# Patient Record
Sex: FEMALE | Race: WHITE | Employment: UNEMPLOYED | URBAN - METROPOLITAN AREA
[De-identification: names, ages, dates, MRNs, and addresses within clinical notes are randomized per-mention and may not be internally consistent; named-entity substitution may affect disease eponyms.]

---

## 2022-11-30 ENCOUNTER — APPOINTMENT (OUTPATIENT)
Dept: GENERAL RADIOLOGY | Age: 47
End: 2022-11-30
Payer: COMMERCIAL

## 2022-11-30 ENCOUNTER — HOSPITAL ENCOUNTER (EMERGENCY)
Age: 47
Discharge: HOME | End: 2022-11-30
Payer: COMMERCIAL

## 2022-11-30 VITALS
TEMPERATURE: 98.1 F | WEIGHT: 137.56 LBS | OXYGEN SATURATION: 99 % | DIASTOLIC BLOOD PRESSURE: 78 MMHG | SYSTOLIC BLOOD PRESSURE: 162 MMHG | BODY MASS INDEX: 24.38 KG/M2 | HEIGHT: 63 IN | HEART RATE: 87 BPM | RESPIRATION RATE: 20 BRPM

## 2022-11-30 DIAGNOSIS — J40 BRONCHITIS: Primary | ICD-10-CM

## 2022-11-30 LAB
RAPID INFLUENZA  B AGN: NEGATIVE
RAPID INFLUENZA A AGN: NEGATIVE
SARS-COV-2, NAAT: NOT DETECTED

## 2022-11-30 PROCEDURE — 87804 INFLUENZA ASSAY W/OPTIC: CPT

## 2022-11-30 PROCEDURE — 87635 SARS-COV-2 COVID-19 AMP PRB: CPT

## 2022-11-30 PROCEDURE — 99284 EMERGENCY DEPT VISIT MOD MDM: CPT

## 2022-11-30 PROCEDURE — 71046 X-RAY EXAM CHEST 2 VIEWS: CPT

## 2022-11-30 PROCEDURE — 6370000000 HC RX 637 (ALT 250 FOR IP): Performed by: NURSE PRACTITIONER

## 2022-11-30 RX ORDER — AMOXICILLIN 500 MG/1
500 CAPSULE ORAL 2 TIMES DAILY
Qty: 14 CAPSULE | Refills: 0 | Status: SHIPPED | OUTPATIENT
Start: 2022-11-30 | End: 2022-12-07

## 2022-11-30 RX ORDER — BENZONATATE 100 MG/1
100-200 CAPSULE ORAL 3 TIMES DAILY PRN
Qty: 30 CAPSULE | Refills: 0 | Status: SHIPPED | OUTPATIENT
Start: 2022-11-30 | End: 2022-12-07

## 2022-11-30 RX ORDER — BUTALBITAL, ACETAMINOPHEN AND CAFFEINE 50; 325; 40 MG/1; MG/1; MG/1
1 TABLET ORAL EVERY 6 HOURS PRN
COMMUNITY
Start: 2022-11-03

## 2022-11-30 RX ORDER — AMLODIPINE BESYLATE 10 MG/1
TABLET ORAL
COMMUNITY
Start: 2022-11-23

## 2022-11-30 RX ORDER — NITROGLYCERIN 0.4 MG/1
0.4 TABLET SUBLINGUAL EVERY 5 MIN PRN
COMMUNITY
Start: 2022-07-12

## 2022-11-30 RX ORDER — BENZONATATE 200 MG/1
CAPSULE ORAL
COMMUNITY
End: 2022-11-30

## 2022-11-30 RX ORDER — PREDNISONE 20 MG/1
60 TABLET ORAL ONCE
Status: COMPLETED | OUTPATIENT
Start: 2022-11-30 | End: 2022-11-30

## 2022-11-30 RX ORDER — CLOPIDOGREL BISULFATE 75 MG/1
TABLET ORAL
COMMUNITY
Start: 2022-11-23

## 2022-11-30 RX ORDER — ASPIRIN 81 MG/1
81 TABLET, CHEWABLE ORAL DAILY
COMMUNITY
Start: 2022-06-12

## 2022-11-30 RX ORDER — CODEINE PHOSPHATE AND GUAIFENESIN 10; 100 MG/5ML; MG/5ML
SOLUTION ORAL
COMMUNITY
Start: 2022-11-15

## 2022-11-30 RX ORDER — ROSUVASTATIN CALCIUM 10 MG/1
TABLET, COATED ORAL
COMMUNITY
Start: 2022-11-23

## 2022-11-30 RX ORDER — ALBUTEROL SULFATE 90 UG/1
AEROSOL, METERED RESPIRATORY (INHALATION)
COMMUNITY
End: 2022-11-30

## 2022-11-30 RX ORDER — ALBUTEROL SULFATE 90 UG/1
2 AEROSOL, METERED RESPIRATORY (INHALATION) 4 TIMES DAILY PRN
Qty: 18 G | Refills: 0 | Status: SHIPPED | OUTPATIENT
Start: 2022-11-30

## 2022-11-30 RX ORDER — AMOXICILLIN 250 MG/1
500 CAPSULE ORAL ONCE
Status: COMPLETED | OUTPATIENT
Start: 2022-11-30 | End: 2022-11-30

## 2022-11-30 RX ORDER — PREDNISONE 20 MG/1
TABLET ORAL
Qty: 18 TABLET | Refills: 0 | Status: SHIPPED | OUTPATIENT
Start: 2022-11-30 | End: 2022-12-10

## 2022-11-30 RX ORDER — CYCLOBENZAPRINE HCL 5 MG
TABLET ORAL
COMMUNITY
Start: 2022-11-05

## 2022-11-30 RX ADMIN — PREDNISONE 60 MG: 20 TABLET ORAL at 19:52

## 2022-11-30 RX ADMIN — AMOXICILLIN 500 MG: 250 CAPSULE ORAL at 19:52

## 2022-11-30 ASSESSMENT — PAIN - FUNCTIONAL ASSESSMENT
PAIN_FUNCTIONAL_ASSESSMENT: NONE - DENIES PAIN
PAIN_FUNCTIONAL_ASSESSMENT: 0-10

## 2022-11-30 ASSESSMENT — PAIN DESCRIPTION - ORIENTATION
ORIENTATION: MID
ORIENTATION: MID;LOWER

## 2022-11-30 ASSESSMENT — PAIN SCALES - GENERAL
PAINLEVEL_OUTOF10: 6
PAINLEVEL_OUTOF10: 6

## 2022-11-30 ASSESSMENT — PAIN DESCRIPTION - ONSET
ONSET: ON-GOING
ONSET: SUDDEN

## 2022-11-30 ASSESSMENT — ENCOUNTER SYMPTOMS
COUGH: 1
GASTROINTESTINAL NEGATIVE: 1

## 2022-11-30 ASSESSMENT — PAIN DESCRIPTION - FREQUENCY
FREQUENCY: INTERMITTENT
FREQUENCY: INTERMITTENT

## 2022-11-30 ASSESSMENT — PAIN DESCRIPTION - LOCATION
LOCATION: GROIN
LOCATION: GROIN

## 2022-11-30 ASSESSMENT — PAIN DESCRIPTION - PAIN TYPE
TYPE: SURGICAL PAIN
TYPE: SURGICAL PAIN

## 2022-11-30 ASSESSMENT — PAIN DESCRIPTION - DESCRIPTORS
DESCRIPTORS: SHARP
DESCRIPTORS: BURNING

## 2022-12-01 NOTE — ED TRIAGE NOTES
Pt presents to ED for cough for the past 2 weeks. Pt has hysterectomy on 11/8 and c/o pain due to cough.

## 2022-12-01 NOTE — ED PROVIDER NOTES
[address]  eMERGENCY dEPARTMENT eNCOUnter    Pt Name: @@  MRN: [id number]  [address]  Date of evaluation: 11/30/2022  Provider: EVARISTO Zuñiga CNP    Independently evaluated by the advanced practice provider    [address]       Chief Complaint   Patient presents with    Cough     For the past 2 weeks. HISTORY OF PRESENT ILLNESS  (Location/Symptom, Timing/Onset, Context/Setting, Quality, Duration, Modifying Factors, Severity.)   Gal Peralta is a 50 y.o. female who presents to the emergencydepartment PMHx: CAD, hypertension, tobacco user    Patient presents to the emergency department reporting that she has had an intermittently productive [place] cough for nearly 3 weeks. Her granddaughter and daughter did have influenza. Patient is 3 weeks postop vaginal hysterectomy. She states the cough is making the vaginal sutures feel as if they are pulling. Negative for fever. Negative for chills. Positive for nasal congestion. Intermittent sore throat. Negative for vomiting or diarrhea. Stop smoking November 5. Endorses that she is taken a few puffs off a black and mild to address her cigarette craving. Nursing Notes were reviewed and I agree. REVIEW OF SYSTEMS    (2-9 systems for level 4, 10 or more for level 5)     Review of Systems   Constitutional: Negative. HENT:  Positive for congestion. Respiratory:  Positive for cough. Cardiovascular: Negative. Gastrointestinal: Negative. Genitourinary: Negative. Musculoskeletal: Negative. Neurological: Negative. Except as noted above the remainder of the review of systems was reviewed and negative.        PAST MEDICAL HISTORY         Diagnosis Date    CAD (coronary artery disease)     Hypertension        SURGICAL HISTORY           Procedure Laterality Date    HYSTERECTOMY (CERVIX STATUS UNKNOWN)         CURRENT MEDICATIONS       Discharge Medication List as of 11/30/2022  8:36 PM CONTINUE these medications which have NOT CHANGED    Details   butalbital-acetaminophen-caffeine (FIORICET, ESGIC) -40 MG per tablet Take 1 tablet by mouth every 6 hours as neededHistorical Med      aspirin 81 MG chewable tablet Take 81 mg by mouth dailyHistorical Med      nitroGLYCERIN (NITROSTAT) 0.4 MG SL tablet Place 0.4 mg under the tongue every 5 minutes as neededHistorical Med      amLODIPine (NORVASC) 10 MG tablet Historical Med      clopidogrel (PLAVIX) 75 MG tablet Historical Med      rosuvastatin (CRESTOR) 10 MG tablet Historical Med      cyclobenzaprine (FLEXERIL) 5 MG tablet Historical Med      guaiFENesin-codeine (GUAIFENESIN AC) 100-10 MG/5ML liquid TAKE 5 ML BY MOUTH EVERY 4 HOURS AS NEEDED FOR COUGH FOR UP TO 5 DAYSHistorical Med             ALLERGIES     Patient has no known allergies. FAMILY HISTORY     History reviewed. No pertinent family history. No family status information on file. SOCIAL HISTORY      reports that she quit smoking about 3 weeks ago. Her smoking use included cigarettes. She has never used smokeless tobacco. She reports that she does not currently use alcohol. She reports that she does not currently use drugs. PHYSICAL EXAM    (up to 7 for level 4, 8 or more for level 5)      ED Triage Vitals [11/30/22 1909]   BP Temp Temp Source Heart Rate Resp SpO2 Height Weight   (!) 162/78 98.1 °F (36.7 °C) Oral 87 20 99 % 5' 3\" (1.6 m) 137 lb 9 oz (62.4 kg)       Physical Exam  Vitals and nursing note reviewed. Constitutional:       General: She is not in acute distress. Appearance: She is not ill-appearing, toxic-appearing or diaphoretic. HENT:      Right Ear: Tympanic membrane, ear canal and external ear normal.      Left Ear: Tympanic membrane, ear canal and external ear normal.      Mouth/Throat:      Mouth: Mucous membranes are moist.      Pharynx: Oropharynx is clear. Cardiovascular:      Rate and Rhythm: Normal rate and regular rhythm.    Pulmonary: Effort: Pulmonary effort is normal.      Breath sounds: Normal breath sounds. No wheezing, rhonchi or rales. Skin:     General: Skin is warm and dry. Neurological:      General: No focal deficit present. Mental Status: She is alert. DIAGNOSTIC RESULTS     RADIOLOGY:   Non-plain film images such as CT, Ultrasound and MRI are read by the radiologist. Plain radiographic images are visualized and preliminarily interpreted by EVARISTO Stauffer CNP with the below findings:        Interpretation per the Radiologist below, if available at the time of this note:    XR CHEST (2 VW)   Final Result      1. No findings for acute cardiopulmonary disease. LABS:  Labs Reviewed   COVID-19, RAPID   RAPID INFLUENZA A/B ANTIGENS       All other labs were within normal range or not returned as of this dictation. EMERGENCY DEPARTMENT COURSE and DIFFERENTIAL DIAGNOSIS/MDM:   Vitals:    Vitals:    11/30/22 1909   BP: (!) 162/78   Pulse: 87   Resp: 20   Temp: 98.1 °F (36.7 °C)   TempSrc: Oral   SpO2: 99%   Weight: 137 lb 9 oz (62.4 kg)   Height: 5' 3\" (1.6 m)     Medications   predniSONE (DELTASONE) tablet 60 mg (60 mg Oral Given 11/30/22 1952)   amoxicillin (AMOXIL) capsule 500 mg (500 mg Oral Given 11/30/22 1952)       [facility]    Patient seen and evaluated per myself. Dr. Librado Wei present available for consultation as needed. Clinical impression is consistent with likely URI, bronchitis. Course she did have an influenza exposure and could be recovering from influenza. Chest x-ray will be obtained. Rapid COVID and influenza will be obtained. Patient is not hypoxic or tachycardic. Head ears eyes nose and throat exam is fairly unremarkable. Breath sounds negative for wheezing rales or rhonchi. Will discharge patient home on Augmentin, steroids, and inhaler. She is instructed to follow-up with primary care physician. Continue to use over-the-counter cough syrup.     Chest x-ray negative for acute findings. COVID and rapid influenza also negative. Will proceed with discharge as discussed above. Prescriptions detailed below. Diagnostic testing results and plan of care were rediscussed with the patient. She was in agreement she is discharged home in good condition. PROCEDURES:  Procedures    FINAL IMPRESSION      1. Bronchitis          DISPOSITION/PLAN   DISPOSITION Decision To Discharge 11/30/2022 08:28:04 PM      PATIENT REFERRED TO:  [address]   [facility]    Schedule an appointment as soon as possible for a visit       [facility] [address] [address]  [phone]    As needed, If symptoms worsen    DISCHARGE MEDICATIONS:  Discharge Medication List as of 11/30/2022  8:36 PM        START taking these medications    Details   amoxicillin (AMOXIL) 500 MG capsule Take 1 capsule by mouth 2 times daily for 7 days, Disp-14 capsule, R-0Print      predniSONE (DELTASONE) 20 MG tablet 3 tabs po qam for 3 days then 2 tabs qam for 3 days the 1 tab qam for 3 days, Disp-18 tablet, R-0Print             (Please note that portions of this note werecompleted with a voice recognition program.  Efforts were made to edit the dictations but occasionally words are mis-transcribed.)    EVARISTO Hanson CNP, APRN - CNP  11/30/22 [id number]

## 2022-12-01 NOTE — ED NOTES
Patient prepared for and ready to be discharged. Patient discharged at this time in no acute distress after verbalizing understanding of discharge instructions. Patient left after receiving After Visit Summary instructions.         Deniz Hassan RN  11/30/22 2047